# Patient Record
(demographics unavailable — no encounter records)

---

## 2024-04-19 DIAGNOSIS — I10 PRIMARY HYPERTENSION: Primary | ICD-10-CM

## 2024-04-19 NOTE — TELEPHONE ENCOUNTER
Patient was taking atenolol/chlorthalidone  calcium was high so other doctor switch him to straight Atenolol 100 mg po every day and calcium improved he now needs a refill on atenolol and wants to be sure he should continue?  [facility]

## 2024-04-24 RX ORDER — ATENOLOL 100 MG/1
100 TABLET ORAL DAILY
COMMUNITY
Start: 2024-03-26 | End: 2024-04-24 | Stop reason: SDUPTHER

## 2024-04-24 NOTE — TELEPHONE ENCOUNTER
Patient was taking atenolol/chlorthalidone calcium was high so other doctor switch him to straight Atenolol 100 mg po every day and calcium improved he now needs a refill on atenolol and wants to be sure he should continue? [facility]         Jarvis Calderon  has called in to request a refill on his:    Atenolol     Medication sent to pharmacy and Jarvis Calderon will be notified of when available for / has been sent out for delivery       Requested Prescriptions     Pending Prescriptions Disp Refills    atenolol (Tenormin) 100 mg tablet 90 tablet 3     Sig: Take 1 tablet (100 mg) by mouth once daily.

## 2024-04-25 RX ORDER — ATENOLOL 100 MG/1
100 TABLET ORAL DAILY
Qty: 90 TABLET | Refills: 3 | Status: SHIPPED | OUTPATIENT
Start: 2024-04-25 | End: 2025-04-20